# Patient Record
Sex: MALE | Race: WHITE | NOT HISPANIC OR LATINO | Employment: UNEMPLOYED | URBAN - METROPOLITAN AREA
[De-identification: names, ages, dates, MRNs, and addresses within clinical notes are randomized per-mention and may not be internally consistent; named-entity substitution may affect disease eponyms.]

---

## 2018-12-02 ENCOUNTER — HOSPITAL ENCOUNTER (OUTPATIENT)
Dept: MEDSURG UNIT | Facility: HOSPITAL | Age: 43
End: 2018-12-03
Attending: SURGERY | Admitting: SURGERY

## 2018-12-02 LAB
ABS NEUT (OLG): 6.7 X10(3)/MCL (ref 1.5–6.9)
ALBUMIN SERPL-MCNC: 3.8 GM/DL (ref 3.4–5)
ALBUMIN/GLOB SERPL: 1.1 RATIO
ALP SERPL-CCNC: 91 UNIT/L (ref 30–113)
ALT SERPL-CCNC: 35 UNIT/L (ref 10–45)
APPEARANCE, UA: CLEAR
APTT PPP: 27.3 SECOND(S) (ref 25–35)
AST SERPL-CCNC: 10 UNIT/L (ref 15–37)
BASOPHILS # BLD AUTO: 0 X10(3)/MCL (ref 0–0.1)
BASOPHILS NFR BLD AUTO: 0 % (ref 0–1)
BILIRUB SERPL-MCNC: 0.7 MG/DL (ref 0.1–0.9)
BILIRUB UR QL STRIP: NEGATIVE
BILIRUBIN DIRECT+TOT PNL SERPL-MCNC: 0.2 MG/DL (ref 0–0.3)
BILIRUBIN DIRECT+TOT PNL SERPL-MCNC: 0.5 MG/DL
BUN SERPL-MCNC: 16 MG/DL (ref 10–20)
CALCIUM SERPL-MCNC: 8.9 MG/DL (ref 8–10.5)
CHLORIDE SERPL-SCNC: 100 MMOL/L (ref 100–108)
CO2 SERPL-SCNC: 28 MMOL/L (ref 21–35)
COLOR UR: NORMAL
CREAT SERPL-MCNC: 1.37 MG/DL (ref 0.7–1.3)
EOSINOPHIL # BLD AUTO: 0.1 X10(3)/MCL (ref 0–0.6)
EOSINOPHIL NFR BLD AUTO: 1 % (ref 0–5)
ERYTHROCYTE [DISTWIDTH] IN BLOOD BY AUTOMATED COUNT: 12.3 % (ref 11.5–17)
GLOBULIN SER-MCNC: 3.6 GM/DL
GLUCOSE (UA): NEGATIVE
GLUCOSE SERPL-MCNC: 103 MG/DL (ref 75–116)
HCT VFR BLD AUTO: 45.2 % (ref 42–52)
HGB BLD-MCNC: 15 GM/DL (ref 14–18)
HGB UR QL STRIP: NEGATIVE
IMM GRANULOCYTES # BLD AUTO: 0.02 10*3/UL (ref 0–0.02)
IMM GRANULOCYTES NFR BLD AUTO: 0.2 % (ref 0–0.43)
INR PPP: 1 (ref 0–1.2)
KETONES UR QL STRIP: NEGATIVE
LEUKOCYTE ESTERASE UR QL STRIP: NEGATIVE
LIPASE SERPL-CCNC: 90 UNIT/L (ref 21–261)
LYMPHOCYTES # BLD AUTO: 2.2 X10(3)/MCL (ref 0.5–4.1)
LYMPHOCYTES NFR BLD AUTO: 22 % (ref 15–40)
MCH RBC QN AUTO: 30 PG (ref 27–34)
MCHC RBC AUTO-ENTMCNC: 33 GM/DL (ref 31–36)
MCV RBC AUTO: 91 FL (ref 80–99)
MONOCYTES # BLD AUTO: 0.6 X10(3)/MCL (ref 0–1.1)
MONOCYTES NFR BLD AUTO: 6 % (ref 4–12)
NEUTROPHILS # BLD AUTO: 6.7 X10(3)/MCL (ref 1.5–6.9)
NEUTROPHILS NFR BLD AUTO: 70 % (ref 43–75)
NITRITE UR QL STRIP: NEGATIVE
PH UR STRIP: 6.5 [PH]
PLATELET # BLD AUTO: 211 X10(3)/MCL (ref 140–400)
PMV BLD AUTO: 10.6 FL (ref 6.8–10)
POTASSIUM SERPL-SCNC: 3.5 MMOL/L (ref 3.6–5.2)
PROT SERPL-MCNC: 7.4 GM/DL (ref 6.4–8.2)
PROT UR QL STRIP: NEGATIVE
PROTHROMBIN TIME: 10.9 SECOND(S) (ref 9–12)
RBC # BLD AUTO: 4.95 X10(6)/MCL (ref 4.7–6.1)
SODIUM SERPL-SCNC: 138 MMOL/L (ref 135–145)
SP GR UR STRIP: <=1.005
UROBILINOGEN UR STRIP-ACNC: 0.2 EU/DL
WBC # SPEC AUTO: 9.7 X10(3)/MCL (ref 4.5–11.5)

## 2018-12-03 LAB
ABS NEUT (OLG): 10.2 X10(3)/MCL (ref 1.5–6.9)
BASOPHILS # BLD AUTO: 0 X10(3)/MCL (ref 0–0.1)
BASOPHILS NFR BLD AUTO: 0 % (ref 0–1)
BUN SERPL-MCNC: 12 MG/DL (ref 10–20)
CALCIUM SERPL-MCNC: 9.1 MG/DL (ref 8–10.5)
CHLORIDE SERPL-SCNC: 101 MMOL/L (ref 100–108)
CO2 SERPL-SCNC: 27 MMOL/L (ref 21–35)
CREAT SERPL-MCNC: 1.29 MG/DL (ref 0.7–1.3)
CREAT/UREA NIT SERPL: 9
ERYTHROCYTE [DISTWIDTH] IN BLOOD BY AUTOMATED COUNT: 12.2 % (ref 11.5–17)
GLUCOSE SERPL-MCNC: 147 MG/DL (ref 75–116)
HCT VFR BLD AUTO: 42.3 % (ref 42–52)
HGB BLD-MCNC: 13.9 GM/DL (ref 14–18)
IMM GRANULOCYTES # BLD AUTO: 0.02 10*3/UL (ref 0–0.02)
IMM GRANULOCYTES NFR BLD AUTO: 0.2 % (ref 0–0.43)
LYMPHOCYTES # BLD AUTO: 0.7 X10(3)/MCL (ref 0.5–4.1)
LYMPHOCYTES NFR BLD AUTO: 6 % (ref 15–40)
MCH RBC QN AUTO: 30 PG (ref 27–34)
MCHC RBC AUTO-ENTMCNC: 33 GM/DL (ref 31–36)
MCV RBC AUTO: 93 FL (ref 80–99)
MONOCYTES # BLD AUTO: 0.4 X10(3)/MCL (ref 0–1.1)
MONOCYTES NFR BLD AUTO: 3 % (ref 4–12)
NEUTROPHILS # BLD AUTO: 10.2 X10(3)/MCL (ref 1.5–6.9)
NEUTROPHILS NFR BLD AUTO: 90 % (ref 43–75)
PLATELET # BLD AUTO: 202 X10(3)/MCL (ref 140–400)
PMV BLD AUTO: 10.7 FL (ref 6.8–10)
POTASSIUM SERPL-SCNC: 4.2 MMOL/L (ref 3.6–5.2)
RBC # BLD AUTO: 4.56 X10(6)/MCL (ref 4.7–6.1)
SODIUM SERPL-SCNC: 139 MMOL/L (ref 135–145)
WBC # SPEC AUTO: 11.2 X10(3)/MCL (ref 4.5–11.5)

## 2022-01-07 ENCOUNTER — HISTORICAL (OUTPATIENT)
Dept: RADIOLOGY | Facility: HOSPITAL | Age: 47
End: 2022-01-07

## 2022-01-07 LAB
ABS NEUT (OLG): 3.5 X10(3)/MCL (ref 1.5–6.9)
ALBUMIN SERPL-MCNC: 4 GM/DL (ref 3.5–5)
ALBUMIN/GLOB SERPL: 1.2 RATIO (ref 1.1–2)
ALP SERPL-CCNC: 66 UNIT/L (ref 40–150)
ALT SERPL-CCNC: 32 UNIT/L (ref 0–55)
APPEARANCE, UA: CLEAR
AST SERPL-CCNC: 18 UNIT/L (ref 5–34)
BASOPHILS # BLD AUTO: 0 X10(3)/MCL (ref 0–0.1)
BASOPHILS NFR BLD AUTO: 0 % (ref 0–1)
BILIRUB SERPL-MCNC: 0.7 MG/DL
BILIRUB UR QL STRIP: NEGATIVE
BILIRUBIN DIRECT+TOT PNL SERPL-MCNC: 0.2 MG/DL (ref 0–0.5)
BILIRUBIN DIRECT+TOT PNL SERPL-MCNC: 0.5 MG/DL (ref 0–0.8)
BUN SERPL-MCNC: 16 MG/DL (ref 8.9–20.6)
CALCIUM SERPL-MCNC: 10 MG/DL (ref 8.7–10.5)
CHLORIDE SERPL-SCNC: 103 MMOL/L (ref 98–107)
CO2 SERPL-SCNC: 29 MMOL/L (ref 22–29)
COLOR UR: YELLOW
CREAT SERPL-MCNC: 1.07 MG/DL (ref 0.73–1.18)
EOSINOPHIL # BLD AUTO: 0.1 X10(3)/MCL (ref 0–0.6)
EOSINOPHIL NFR BLD AUTO: 2 % (ref 0–5)
ERYTHROCYTE [DISTWIDTH] IN BLOOD BY AUTOMATED COUNT: 12.3 % (ref 11.5–17)
GLOBULIN SER-MCNC: 3.2 GM/DL (ref 2.4–3.5)
GLUCOSE (UA): NEGATIVE MG/DL
GLUCOSE SERPL-MCNC: 89 MG/DL (ref 74–100)
HCT VFR BLD AUTO: 45.2 % (ref 42–52)
HGB BLD-MCNC: 15.1 GM/DL (ref 14–18)
HGB UR QL STRIP: NEGATIVE
IMM GRANULOCYTES # BLD AUTO: 0.03 10*3/UL (ref 0–0.02)
IMM GRANULOCYTES NFR BLD AUTO: 0.5 % (ref 0–0.43)
KETONES UR QL STRIP: NEGATIVE MG/DL
LEUKOCYTE ESTERASE UR QL STRIP: NEGATIVE
LYMPHOCYTES # BLD AUTO: 1.8 X10(3)/MCL (ref 0.5–4.1)
LYMPHOCYTES NFR BLD AUTO: 30 % (ref 15–40)
MCH RBC QN AUTO: 30 PG (ref 27–34)
MCHC RBC AUTO-ENTMCNC: 33 GM/DL (ref 31–36)
MCV RBC AUTO: 90 FL (ref 80–99)
MONOCYTES # BLD AUTO: 0.4 X10(3)/MCL (ref 0–1.1)
MONOCYTES NFR BLD AUTO: 7 % (ref 4–12)
NEUTROPHILS # BLD AUTO: 3.5 X10(3)/MCL (ref 1.5–6.9)
NEUTROPHILS NFR BLD AUTO: 60 % (ref 43–75)
NITRITE UR QL STRIP: NEGATIVE
PH UR STRIP: 5.5 [PH] (ref 4.6–8)
PLATELET # BLD AUTO: 196 X10(3)/MCL (ref 140–400)
PMV BLD AUTO: 10.3 FL (ref 6.8–10)
POTASSIUM SERPL-SCNC: 4.3 MMOL/L (ref 3.5–5.1)
PROT SERPL-MCNC: 7.2 GM/DL (ref 6.4–8.3)
PROT UR QL STRIP: NEGATIVE MG/DL
RBC # BLD AUTO: 5 X10(6)/MCL (ref 4.7–6.1)
SODIUM SERPL-SCNC: 140 MMOL/L (ref 136–145)
SP GR UR STRIP: 1.02 (ref 1–1.03)
UROBILINOGEN UR STRIP-ACNC: 0.2 EU/DL
WBC # SPEC AUTO: 5.9 X10(3)/MCL (ref 4.5–11.5)

## 2022-04-11 ENCOUNTER — HISTORICAL (OUTPATIENT)
Dept: ADMINISTRATIVE | Facility: HOSPITAL | Age: 47
End: 2022-04-11

## 2022-04-27 VITALS
SYSTOLIC BLOOD PRESSURE: 109 MMHG | BODY MASS INDEX: 32.78 KG/M2 | WEIGHT: 203.94 LBS | DIASTOLIC BLOOD PRESSURE: 69 MMHG | HEIGHT: 66 IN | OXYGEN SATURATION: 97 %

## 2022-04-29 NOTE — OP NOTE
Patient:   Basilio Alejandro             MRN: [id number]            FIN: [id number]               Age:   43 years     Sex:  Male     :  1975   Associated Diagnoses:   AP - Abdominal pain; Acute appendicitis   Author:   Shabnam Medrano MD      Operative Note   Operative Information   Date/ Time:  2018 20:23:00.     Procedures Performed: Procedure Code   Laparoscopy, surgical, appendectomy (90652)..     Indications: 43-year-old white male with signs and symptoms consistent with acute appendicitis and imaging consistent with the same, consenting to undergo urgent laparoscopic appendectomy.     Preoperative Diagnosis: AP - Abdominal pain (PNED [id number]), Acute appendicitis ([id number] K35.80).     Postoperative Diagnosis: Acute appendicitis (GIH85-VE K35.80).     Surgeon: Shabnam Medrano MD.     Anesthesia: Gen..     Speciman Removed: appendix and mesoappendix.     Description of Procedure/Findings/    Complications:  After appropriate consents were obtained the patient was brought to the operating theater laid in the supine position.  General endotracheal intubation and anesthesia were performed without incident.  Then the abdomen from the nipples down to bilateral groins were sterilely prepped and draped in normal surgical fashion using chlorhexidine.    An infraumbilical site was infiltrated with 1% lidocaine.   a #15 blade was used to dissect down to the level of the fascia.  A Veress needle was introduced in the abdomen on the 1st pass and the abdominal cavity was insufflated to 15 mm Hg pressure.  A 5 mm Visiport trochar  was introduced into the abdomen without incident.  The abdominal contents were then inspected.  In the right lower quadrant was noted to be some turbid fluid along with a grossly edematous and  inflamed appendix.  There was noted to be the start of necrosis at the tip without gross perforation.    A 2nd dissecting trocar was then placed in the  suprapubic region measuring 5 mm in size  under direct visualization and then the right upper quadrant 12 mm trocar was placed under direct visualization.    The patient was then placed in slight Trendelenburg with right side up and the small bowel located within the pelvis was advanced to the right upper quadrant using atraumatic graspers.    The appendix was lightly grasping the antimesenteric border close to the base  and dissection of the mesoappendix was performed using an Endo Harmonic scalpel.   Appendiceal vessel was taken without incident or bloody oozing  and the base of the appendix was transected using a linear endo-stapling device blue load.   The base of the appendix was transected without incident and visually inspected  for staple line  abnormalities or bleeding.   Neither were noted and the appendix was placed within an Endo Catch bag.    The right colic gutter was then irrigated well with saline and suctioned of all fibrinous debris and fluid collections.  The base of the appendix was monitored for a period of time to identify any oozing from transected vessels or on the appendiceal tip and none were noted.   The Endo Catch bag was retrieved to the right quadrant port site without difficulty.  The remaining trochars were then removed under direct visualization and no bleeding was noted on the abdominal wall.  The right upper quadrant 12 mm trocar site was closed using 0 Vicryl in a figure-of-eight fashion.   The skin overlying each of the trocar sites were then closed using 4-0 Monocryl in a subcuticular fashion.   a sterile dressing was placed over the 3 trocar sites.  The patient was relieved of anesthesia in a stable condition,  all  instrument counts were correct ×3.  the patient was then transferred to postanesthesia care unit in stable condition  .     Esimated blood loss: loss  10  cc.     Findings: acutely inflamed appendix.     Complications: None.

## 2022-04-29 NOTE — ED PROVIDER NOTES
Patient:   Basilio Alejandro             MRN: [id number]            FIN: [id number]               Age:   43 years     Sex:  Male     :  1975   Associated Diagnoses:   Acute appendicitis   Author:   Victor Hugo Alcala MD      Basic Information   Time seen: Date & time 2018 18:00:00.   History source: Patient.   Arrival mode: Private vehicle.   History limitation: None.   Additional information: Chief Complaint from Nursing Triage Note : Chief Complaint   2018 17:39 CST      Chief Complaint           was seen at Urgent care this am and had outpatient testing at [facility]. [facility] called him at 1630 and told him come to the ER he has an appendicitis. States been having Rt abd pain on and off for 1 week  .      History of Present Illness   The patient presents with abdominal pain.  The onset was 1  weeks ago.  The course/duration of symptoms is fluctuating in intensity.  The character of symptoms is achy.  The degree at onset was moderate.  The Location of pain at onset was right, lower and abdominal.  The degree at present is minimal.  The Location of pain at present is right, lower and abdominal.  Radiating pain: none. The exacerbating factor is none.  The relieving factor is none.  Therapy today: none.  Risk factors consist of none.  Associated symptoms: nausea, vomiting, denies diarrhea, denies fever and denies chills.         Patient has been having right lower quadrant abdominal pain for a week, off and on with fluctuating intensity, went to the urgent care today, had a CT scan done in [facility] and he was called back saying that he has acute appendicitis and he needs to go to the emergency room, so he decided to come to the emergency room in [place] to get the surgery done.      Review of Systems   Constitutional symptoms:  No fever,    Gastrointestinal symptoms:  Abdominal pain, nausea, vomiting, no diarrhea, no constipation.              Additional  review of systems information: All other systems reviewed and otherwise negative.      Health Status   Allergies:    Allergic Reactions (Selected)  No Known Medication Allergies.   Medications:  (Selected)   Inpatient Medications  Ordered  IVF Normal Saline NS Infusion: 1,000 mL, 1,000 mL, IV, 1000 mL/hr, start date 18 18:12:00 CST, stop date 18 18:12:00 CST, STAT  Metronidazole 500 mg / 100 ml premix: 500 mg, form: Infusion, IV, Once, Infuse over: 1 hr, first dose 18 18:16:00 CST, stop date 18 18:16:00 CST, STAT  Zosyn 4.5 gm (for IVPB): 3.375 gm, IV, q8hr, first dose 18 18:13:00 CST, STAT  morphine 4 mg/mL preservative-free intravenous solution: 4 mg, form: Injection, IV Push, q4hr PRN for pain, moderate, first dose 18 18:17:00 CST, per nurse's notes.      Past Medical/ Family/ Social History   Medical history: Negative.   Surgical history: Negative.   Family history:    Primary malignant neoplasm of lung  Father ()  , Reviewed as documented in chart.   Social history:    Social & Psychosocial Habits    Alcohol  2018  Use: Current    Type: Beer    Frequency: 3-5 times per week    Substance Abuse  2018  Use: Never    Tobacco  2018  Use: Former smoker    Smoking Cessation Counseling N/A, Reviewed as documented in chart.   Problem list:    Active Problems (1)  Obesity , per nurse's notes.      Physical Examination               Vital Signs   Vital Signs   2018 17:39 CST      Temperature Oral          36.7 DegC                             Temperature Oral (calculated)             98.06 DegF                             Peripheral Pulse Rate     103 bpm  HI                             Respiratory Rate          16 br/min                             SpO2                      98 %                             Oxygen Therapy            Room air                             Systolic Blood Pressure   136 mmHg                             Diastolic Blood Pressure   97 mmHg  HI  .   Measurements   12/2/2018 17:39 CST      Weight Dosing             91.5 kg                             Weight Measured           91.5 kg                             Weight Measured and Calculated in Lbs     201.72 lb                             Height/Length Dosing      168 cm                             Height/Length Measured    168 cm                             Body Mass Index Measured  32.42 kg/m2  .   Basic Oxygen Information   12/2/2018 17:39 CST      SpO2                      98 %                             Oxygen Therapy            Room air  .   General:  Alert, no acute distress.    Skin:  Normal for ethnicity.   Head:  Normocephalic.   Neck:  Supple.   Eye:  Extraocular movements are intact, No icterus.    Ears, nose, mouth and throat:  Oral mucosa moist, no pharyngeal erythema or exudate.    Cardiovascular:  Regular rate and rhythm, No murmur, Normal peripheral perfusion, No edema.    Respiratory:  Lungs are clear to auscultation, respirations are non-labored, breath sounds are equal.    Back:  Nontender, Normal range of motion.    Musculoskeletal:  Normal ROM.   Chest wall   Gastrointestinal:  Soft, Non distended, Tenderness: Mild, right lower quadrant, Guarding: Negative, Rebound: Negative, Bowel sounds: Normal.    Neurological:  Alert and oriented to person, place, time, and situation, normal motor observed, normal speech observed.    Lymphatics   Psychiatric:  Cooperative, appropriate mood & affect.       Medical Decision Making   Documents reviewed:  Emergency department nurses' notes.   Orders  Launch Order Profile (Selected)   Inpatient Orders  Ordered  Metronidazole 500 mg / 100 ml premix: 500 mg, form: Infusion, IV, Once, Infuse over: 1 hr, first dose 12/02/18 18:16:00 CST, stop date 12/02/18 18:16:00 CST, STAT  Saline Lock Insert: 12/02/18 18:12:00 CST, Once-NOW, Stop date 12/02/18 18:12:00 CST  Zosyn 4.5 gm (for IVPB): 3.375 gm, IV, q8hr, first dose 12/02/18 18:13:00 CST,  STAT  morphine 4 mg/mL preservative-free intravenous solution: 4 mg, form: Injection, IV Push, q4hr PRN for pain, moderate, first dose 12/02/18 18:17:00 CST  Ordered (Dispatched)  Blood Culture: 12/02/18 18:13:00 CST, Stat collect, Blood, Stop date 12/02/18 18:14:00 CST, Print Label By Order Location  Urinalysis with Microscopic if Indicated: Stat collect, Urine, 12/02/18 18:12:00 CST, Stop date 12/02/18 18:14:00 CST, Nurse collect, Print Label By Order Location  Ordered (Exam Ordered)  CT Abdomen and Pelvis W/O Contrast: Stat, 12/02/18 18:15:00 CST, Abdominal Pain, RLQ, None, Stretcher, Rad Type, Schedule this test, 12/02/18 18:15:00 CST  Ordered (In-Lab)  CMP: Stat collect, 12/02/18 18:12:00 CST, Blood, Once, Stop date 12/02/18 18:14:00 CST, Lab Collect, Print Label By Order Location, 12/02/18 18:12:00 CST  Lipase Level: Stat collect, 12/02/18 18:12:00 CST, Blood, Stop date 12/02/18 18:14:00 CST, Lab Collect, 12/02/18 18:12:00 CST  PT (Protime): Stat collect, 12/02/18 18:12:00 CST, Blood, Stop date 12/02/18 18:14:00 CST, Lab Collect, 12/02/18 18:12:00 CST  PTT: Stat collect, 12/02/18 18:12:00 CST, Blood, Stop date 12/02/18 18:14:00 CST, Lab Collect, Print Label By Order Location, 12/02/18 18:12:00 CST  Completed  Automated Diff: Stat collect, 12/02/18 17:50:00 CST, Blood, Collected, Stop date 12/02/18 17:50:00 CST, Lab Collect, Print Label By Order Location, 12/02/18 18:12:00 CST  CBC w/ Auto Diff: Stat collect, 12/02/18 18:12:00 CST, Blood, Stop date 12/02/18 18:14:00 CST, Lab Collect, Print Label By Order Location, 12/02/18 18:12:00 CST  IVF Normal Saline NS Infusion: 1,000 mL, 1,000 mL, IV, 1000 mL/hr, start date 12/02/18 18:12:00 CST, stop date 12/02/18 18:12:00 CST, STAT.   Results review:  Lab results : [facility]   12/2/2018 17:50 CST      Sodium Lvl                138 mmol/L                             Potassium Lvl             3.5 mmol/L  LOW                             Chloride                  100 mmol/L                              CO2                       28 mmol/L                             Calcium Lvl               8.9 mg/dL                             Glucose Lvl               103 mg/dL                             BUN                       16 mg/dL                             Creatinine                1.37 mg/dL  HI                             eGFR-AA                   >60 mL/min/1.73 m2  NA                             eGFR-KIARA                  >60 mL/min/1.73 m2  NA                             Bili Total                0.7 mg/dL                             Bili Direct               0.20 mg/dL                             Bili Indirect             0.50 mg/dL  NA                             AST                       10 unit/L  LOW                             ALT                       35 unit/L                             Alk Phos                  91 unit/L                             Total Protein             7.4 gm/dL                             Albumin Lvl               3.8 gm/dL                             Globulin                  3.60 gm/dL  NA                             A/G Ratio                 1.1 ratio  NA                             Lipase Lvl                90 unit/L                             PT                        10.9 second(s)                             INR                       1.0                             PTT                       27.3 second(s)                             WBC                       9.7 x10(3)/mcL                             RBC                       4.95 x10(6)/mcL                             Hgb                       15.0 gm/dL                             Hct                       45.2 %                             Platelet                  211 x10(3)/mcL                             MCV                       91 fL                             MCH                       30 pg                             MCHC                      33 gm/dL                             RDW                        12.3 %                             MPV                       10.6 fL  HI                             Abs Neut                  6.7 x10(3)/mcL                             Neutro Auto               70 %                             Lymph Auto                22 %                             Mono Auto                 6 %                             Eos Auto                  1 %                             Abs Eos                   0.1 x10(3)/mcL                             Basophil Auto             0 %                             Abs Neutro                6.7 x10(3)/mcL                             Abs Lymph                 2.2 x10(3)/mcL                             Abs Mono                  0.6 x10(3)/mcL                             Abs Baso                  0.0 x10(3)/mcL                             IG%                       0.200 %                             IG#                       0.0200  HI    , Lab results : [facility]   12/2/2018 18:40 CST      UA Appear                 CLEAR                             UA Color                  STRAW                             UA Spec Grav              <=1.005                             UA Bili                   Negative                             UA pH                     6.5  NA                             UA Urobilinogen           0.2 EU/dL                             UA Blood                  Negative                             UA Glucose                Negative                             UA Ketones                Negative                             UA Protein                Negative                             UA Nitrite                Negative                             UA Leuk Est               Negative    ,    No qualifying data available.       Impression and Plan   Diagnosis   Acute appendicitis (END85-EU K35.80)      Calls-Consults   -  12/2/2018 18:10:00 , Mitchell RODRIGUEZ, Shabnam CONNELL, recommends Repeat CT and admit will do surgery later on.    Plan    Disposition: Medically cleared, Admit time  12/2/2018 18:21:00, Place in Observation Unit.    Orders: Launch Orders   Admit/Transfer/Discharge:  Admit to Outpatient Observation (Order): 12/2/2018 18:21 CST, Mitchell RODRIGUEZ, Shabnam CONNELL Medical Unit, No, Acute appendicitis  .

## 2025-02-25 ENCOUNTER — HOSPITAL ENCOUNTER (OUTPATIENT)
Dept: RADIOLOGY | Facility: HOSPITAL | Age: 50
Discharge: HOME | End: 2025-02-25
Payer: COMMERCIAL

## 2025-02-25 DIAGNOSIS — R20.2 PARESTHESIA: ICD-10-CM

## 2025-02-25 DIAGNOSIS — M54.2 CERVICALGIA: ICD-10-CM

## 2025-02-25 PROCEDURE — 72040 X-RAY EXAM NECK SPINE 2-3 VW: CPT | Mod: TC
